# Patient Record
Sex: FEMALE | Race: WHITE | NOT HISPANIC OR LATINO | URBAN - NONMETROPOLITAN AREA
[De-identification: names, ages, dates, MRNs, and addresses within clinical notes are randomized per-mention and may not be internally consistent; named-entity substitution may affect disease eponyms.]

---

## 2020-06-10 ENCOUNTER — LAB OUTSIDE AN ENCOUNTER (OUTPATIENT)
Dept: URBAN - NONMETROPOLITAN AREA CLINIC 2 | Facility: CLINIC | Age: 65
End: 2020-06-10

## 2020-06-11 LAB
A/G RATIO: 1.5
ALBUMIN: 4
ALKALINE PHOSPHATASE: 109
ALT (SGPT): 13
AST (SGOT): 17
BASO (ABSOLUTE): 0
BASOS: 0
BILIRUBIN, TOTAL: 0.3
BUN/CREATININE RATIO: 20
BUN: 16
CALCIUM: 9.2
CARBON DIOXIDE, TOTAL: 21
CHLORIDE: 105
CREATININE: 0.79
EGFR IF AFRICN AM: 93
EGFR IF NONAFRICN AM: 80
EOS (ABSOLUTE): 0.2
EOS: 4
GLOBULIN, TOTAL: 2.7
GLUCOSE: 94
HEMATOCRIT: 39.2
HEMATOLOGY COMMENTS:: (no result)
HEMOGLOBIN: 12.8
IMMATURE CELLS: (no result)
IMMATURE GRANS (ABS): 0
IMMATURE GRANULOCYTES: 0
LYMPHS (ABSOLUTE): 1.2
LYMPHS: 27
MCH: 31.8
MCHC: 32.7
MCV: 98
MONOCYTES(ABSOLUTE): 0.4
MONOCYTES: 9
NEUTROPHILS (ABSOLUTE): 2.7
NEUTROPHILS: 60
NRBC: (no result)
PLATELETS: 217
POTASSIUM: 4.7
PROTEIN, TOTAL: 6.7
RBC: 4.02
RDW: 12.8
SODIUM: 139
WBC: 4.5

## 2020-06-18 ENCOUNTER — OFFICE VISIT (OUTPATIENT)
Dept: URBAN - NONMETROPOLITAN AREA CLINIC 2 | Facility: CLINIC | Age: 65
End: 2020-06-18
Payer: OTHER GOVERNMENT

## 2020-06-18 ENCOUNTER — DASHBOARD ENCOUNTERS (OUTPATIENT)
Age: 65
End: 2020-06-18

## 2020-06-18 DIAGNOSIS — R10.9 ABDOMINAL PAIN: ICD-10-CM

## 2020-06-18 DIAGNOSIS — R74.8 ELEVATED LIVER ENZYMES: ICD-10-CM

## 2020-06-18 DIAGNOSIS — Z12.11 COLON CANCER SCREENING: ICD-10-CM

## 2020-06-18 PROCEDURE — G8427 DOCREV CUR MEDS BY ELIG CLIN: HCPCS | Performed by: NURSE PRACTITIONER

## 2020-06-18 PROCEDURE — G9903 PT SCRN TBCO ID AS NON USER: HCPCS | Performed by: NURSE PRACTITIONER

## 2020-06-18 PROCEDURE — 99213 OFFICE O/P EST LOW 20 MIN: CPT | Performed by: NURSE PRACTITIONER

## 2020-06-18 PROCEDURE — 3017F COLORECTAL CA SCREEN DOC REV: CPT | Performed by: NURSE PRACTITIONER

## 2020-06-18 PROCEDURE — G8420 CALC BMI NORM PARAMETERS: HCPCS | Performed by: NURSE PRACTITIONER

## 2020-06-18 RX ORDER — ASPIRIN 81 MG/1
CHEW 1 TABLET (81 MG) BY ORAL ROUTE ONCE DAILY TABLET, CHEWABLE ORAL 1
Qty: 0 | Refills: 0 | Status: ACTIVE | COMMUNITY
Start: 1900-01-01

## 2020-06-18 RX ORDER — CARVEDILOL 3.12 MG/1
TAKE 1 TABLET (3.125 MG) BY ORAL ROUTE 2 TIMES PER DAY WITH FOOD TABLET, FILM COATED ORAL 2
Qty: 0 | Refills: 0 | Status: ACTIVE | COMMUNITY
Start: 1900-01-01

## 2020-06-18 RX ORDER — LOSARTAN POTASSIUM 50 MG/1
TAKE 1 TABLET (50 MG) BY ORAL ROUTE ONCE DAILY TABLET ORAL 1
Qty: 0 | Refills: 0 | Status: ACTIVE | COMMUNITY
Start: 1900-01-01

## 2020-06-18 NOTE — HPI-OTHER HISTORIES
9/2019 Ms. Ines Phillips is a 64 year old female here for elevated liver enzymes. She had elevated liver enzymes starting in July- , , and . Hep B surface AB positive, Hep A AB positive, and Hep C negative. She was advised to stop her atorvastatin and stop alcohol. She had repeat labs that per her report were normal. She restarted her atorvastatin and soon felt poor. She had dark urine, possible fever, and itching. She returned to her PCP with Bilirubin 2, , [id number], and . Her atorvastatin was again stopped. Her dark urine and fever had resolved. She continues to have some itching at her shoulders and neck but this has been going on for 8 months. She denies any elevated liver enzymes prior to July. She was drinking 1-2 beers 2 times a week. She denies any IV drug use. She denies any new medications. She denies any travel. She denies any family hx of liver disease. She has never had an US of her liver.  She had a large polyp requiring surgical resection in 2015 with Dr ALEJANDRO. Her last colonoscopy was 2018 and normal. She has 3 BM a day that are hard. if she travels she gets constipated. CS  December 18, 2019 Ms. Phillips returns for elevated liver tests. She had an US with prominent CBD 7mm, distended GB, and steatosis. Her chronic liver work up showed an equivocal AMA. Her  and AST/ALT in the 60's. Since her last OV, she has stopped the statin and avoided alcohol. She had two episodes of pain and dark urine that only lasted for a few hours. Her weight and appetite are stable. CS  March 18, 2020 Ms. Phillips is here for f/u of elevated liver tests. She has been off her statin for 6 months. At her last OV, she had been off alcohol and statin for 3 months and her labs were alsmmost back to normal. Since her last OV, she had been drinking alcohol occassionally. She denies any return of dark urine or severe itching. She has had some abdominal pain in the morning. She has noticed this is after a rich meal. She has been cutting back and the pain is less often. CS

## 2020-06-18 NOTE — HPI-TODAY'S VISIT:
Ms. Ines Phillips is here for f/u of elevated liver tests. This was thought to be a combination of alcohol and statin. She was off these for 6 months and her liver tests returned to normal. She restarted occassional alcohol and her LFTs remained normal in March. She was advised to restart her statin. She did not start this. She was concerned about restarting this during the COVID pandemic and needing to receive urgent care if she had a bad side effect. She came last week to our office and had labs drawn. These were again normal. She denies any dark urine or itching. She denies any further RUQ pain. Overall, she is feeling well and is frustrated she came today since nothing had changed from her last OV. CS

## 2020-06-22 ENCOUNTER — WEB ENCOUNTER (OUTPATIENT)
Dept: URBAN - NONMETROPOLITAN AREA CLINIC 2 | Facility: CLINIC | Age: 65
End: 2020-06-22

## 2020-06-26 ENCOUNTER — WEB ENCOUNTER (OUTPATIENT)
Dept: URBAN - NONMETROPOLITAN AREA CLINIC 2 | Facility: CLINIC | Age: 65
End: 2020-06-26

## 2023-12-15 ENCOUNTER — OUT OF OFFICE VISIT (OUTPATIENT)
Dept: URBAN - NONMETROPOLITAN AREA SURGERY CENTER 1 | Facility: SURGERY CENTER | Age: 68
End: 2023-12-15
Payer: OTHER GOVERNMENT

## 2023-12-15 DIAGNOSIS — Z09 ENCOUNTER FOR COLONOSCOPY FOLLOWING COLON POLYP REMOVAL: ICD-10-CM

## 2023-12-15 DIAGNOSIS — Z12.11 COLON CANCER SCREENING: ICD-10-CM

## 2023-12-15 DIAGNOSIS — Z86.010 ADENOMAS PERSONAL HISTORY OF COLONIC POLYPS: ICD-10-CM

## 2023-12-15 DIAGNOSIS — Z86.010 PERSONAL HISTORY OF COLON POLYPS: ICD-10-CM

## 2023-12-15 DIAGNOSIS — Z98.0 INTESTINAL ANASTOMOSIS PRESENT: ICD-10-CM

## 2023-12-15 PROCEDURE — G0105 COLORECTAL SCRN; HI RISK IND: HCPCS | Performed by: INTERNAL MEDICINE

## 2023-12-15 PROCEDURE — 00812 ANES LWR INTST SCR COLSC: CPT | Performed by: NURSE ANESTHETIST, CERTIFIED REGISTERED

## 2023-12-15 PROCEDURE — G8907 PT DOC NO EVENTS ON DISCHARG: HCPCS | Performed by: INTERNAL MEDICINE

## 2023-12-15 RX ORDER — CARVEDILOL 3.12 MG/1
TAKE 1 TABLET (3.125 MG) BY ORAL ROUTE 2 TIMES PER DAY WITH FOOD TABLET, FILM COATED ORAL 2
Qty: 0 | Refills: 0 | Status: ACTIVE | COMMUNITY
Start: 1900-01-01

## 2023-12-15 RX ORDER — LOSARTAN POTASSIUM 50 MG/1
TAKE 1 TABLET (50 MG) BY ORAL ROUTE ONCE DAILY TABLET ORAL 1
Qty: 0 | Refills: 0 | Status: ACTIVE | COMMUNITY
Start: 1900-01-01

## 2023-12-15 RX ORDER — ASPIRIN 81 MG/1
CHEW 1 TABLET (81 MG) BY ORAL ROUTE ONCE DAILY TABLET, CHEWABLE ORAL 1
Qty: 0 | Refills: 0 | Status: ACTIVE | COMMUNITY
Start: 1900-01-01